# Patient Record
(demographics unavailable — no encounter records)

---

## 2025-07-08 NOTE — PHYSICAL EXAM
[No Acute Distress] : no acute distress [No Respiratory Distress] : no respiratory distress  [No Accessory Muscle Use] : no accessory muscle use [Alert and Oriented x3] : oriented to person, place, and time [de-identified] : b/l le edema

## 2025-07-08 NOTE — REVIEW OF SYSTEMS
[Lower Ext Edema] : lower extremity edema [Shortness Of Breath] : shortness of breath [Negative] : Heme/Lymph [Chest Pain] : no chest pain [Wheezing] : no wheezing [Cough] : no cough [Dyspnea on Exertion] : no dyspnea on exertion

## 2025-07-08 NOTE — ASSESSMENT
[FreeTextEntry1] : Patient is a 87 year old female enrolled in the [facility] TCM program s/p a recent discharge from [facility] on 6/3/25 - 6/11/25 for chf, nstemi. PMH cad s/p cabg, mi, hf, htn, hld, dm, pvd, ckd, former smoker. Patient was diursed, on heparin gtt, given bipap, duonebs and sent home without HCS. Pt had initial tcm home visit on 6/16/25 with NP. Telehealth today, patient alert in NAD, denies CP, fever, chills, or n/v/d. Reports le edema worsening and sob at night while lying flat. F/U appointment cardio 7/21/25, pcp 7/30/25.

## 2025-07-08 NOTE — COUNSELING
[de-identified] : Pt was informed about CNs role/ [facility] program and overview of transitional care reviewed with patient. In addition, yellow contact card was provided. Pt educated on topics of importance such as compliance with all provider visits, prescribed medication regimen, and low salt diet. Pt encouraged calling CN with any issues, concerns or questions, also educated to notify CN if experiencing CP, SOB , cough, increased mucus/phlegm production, abdominal discomfort/swelling, difficulty sleeping or lying flat, fever, chills, fatigue, weight gain of 2-3lbs in 24 hours or 5lbs in one week,  dizziness, lightheadedness, n/v/d/c, swelling to extremities and/or any signs of CHF exacerbation as reviewed. Reassurance provided. Will continue to monitor  Patient advised to continue with anticoagulation/antiplatelet regimen as directed. Medication education discussed in full detail with + teach back. Encouraged verbalization of fears and concerns. Report all chest pain/discomfort not relieved by rest or medication. Educated on monitoring blood pressure. Encouraged Smoking and alcohol cessation. Maintain Balanced diet Exercise as appropriate. Patient educated on s/s of MI with + teach back. Contact information given, patient advised to call with any questions/concerns.

## 2025-07-08 NOTE — HISTORY OF PRESENT ILLNESS
[Home] : at home, [unfilled] , at the time of the visit. [Other Location: e.g. Home (Enter Location, City,State)___] : at [unfilled] [Telehealth (audio & video)] : This visit was provided via telehealth using real-time 2-way audio visual technology. [Verbal consent obtained from patient] : the patient, [unfilled] [Spouse] : spouse [FreeTextEntry1] : follow up after being discharged from the hospital for chf, nstemi [de-identified] : Patient is a 87 year old female enrolled in the [facility] TCM program s/p a recent discharge from [facility] on 6/3/25 - 6/11/25 for chf, nstemi. PMH cad s/p cabg, mi, hf, htn, hld, dm, pvd, ckd, former smoker. Patient was diursed, on heparin gtt, given bipap, duonebs and sent home without HCS. Pt had initial tcm home visit on 6/16/25 with NP. Telehealth today, patient alert in NAD, denies CP, fever, chills, or n/v/d. Reports le edema worsening and sob at night while lying flat. F/U appointment cardio 7/21/25, pcp 7/30/25.  Copied from [facility]:   Hospital Course: Reason for Admission: Shortness of breath for two weeks, further declining leading to admission for potential CHF exacerbation and NSTEMI workup. Problem List: #Acute Hypoxic Respiratory Failure #Acute on Chronic Diastolic Congestive Heart Failure (CHF)  #NSTEMI #FRANK #Emphysema #Coronary Artery Disease (CAD) post-CABG #Essential Hypertension (HTN) #Type 2 Diabetes Mellitus (DM2) #Peripheral Vascular Disease (PVD) with Aortobifemoral Bypass Hospital Course Organized by Problems: #Acute Hypoxic Respiratory Failure The patient presented with worsening shortness of breath resolving throughout the admission with SaO2 improving to 93-96% on room air. Responsive to supportive care measures indicating progression towards normal respiratory function baseline. #Acute on Chronic Diastolic CHF and NSTEMI CXR indicated mild congestive findings; CTA negated pulmonary embolism though revealed emphysema, interstitial edema, and small bilateral pleural effusions suggestive of CHF/fluid overload. TTE noted new wall motion abnormalities and moderate mitral regurgitation with preserved ejection fraction. Elevated troponin levels decreased from peak after heparin therapy indicating possible NSTEMI. Continued on aspirin and statin post-discharge. Cardiology evaluated -> follow-up referral to Dr. Ventura for outpatient cardiac care for Cath.  #Renal and Electrolyte Imbalances Monitored and addressed with nephrology consultation. Hyponatremia improved with Tolvaptan, hypokalemia corrected, and creatinine levels reducing trending towards a new baseline. Restart home lasix. F/u outpatient Nephrology  #Emphysema Observed on imaging, commenced treatment with Advair and provided Duonebs PRN enhancing respiratory management chartered during routine improvement sightings. #Coronary Artery Disease (CAD) post-CABG and Peripheral Vascular Disease (PVD) Continued preventative cardiovascular care with aspirin and statin usage fundamental towards sustained post-CABG stabilization and PVD history management fulfilling both arterial intervention necessities and sustained observance. #Essential Hypertension and Type 2 Diabetes Mellitus (DM2) Antihypertensive therapy maintained observing BP ranges with emphasis on compliance dedication at-home environment. Previous DM2 regimen remaining which aligns with calm diabetes control citing A1C at 6.3. Discharge Instructions: Cardiac and Respiratory Care: Maintain follow-up with cardiologist Dr. Ventura as scheduled post-discharge for cardiac concerns. Adhere to provided respiratory medications, reporting shortness of breath or symptomatic returns expediently to evaluate potential CHF recurrence. Renal Function and Diet Monitoring: Encourage oral intake watch as hydrating imperfections inhibit hyponatremia reoccurrence. Monitoring BMP beyond discharge duration streamlines kidney function assessments correlating general wellness continuance inducted via nephrology's overview. Chronic Condition Adherence: Maintain consistent medication consumption inclusive of antihypertensive and diabetic regimes endorsing system correction camps dictating long-term patient health pursuits consistently. Family or caregivers should support noted instructions, engage in aiding daily task execution, and maintain keen observance over patient symptoms chartering in secure covering patient-centric remedies contributing towards seamless living computations around home environs.